# Patient Record
Sex: MALE
[De-identification: names, ages, dates, MRNs, and addresses within clinical notes are randomized per-mention and may not be internally consistent; named-entity substitution may affect disease eponyms.]

---

## 2020-12-09 ENCOUNTER — APPOINTMENT (OUTPATIENT)
Dept: INTERNAL MEDICINE | Facility: CLINIC | Age: 25
End: 2020-12-09

## 2020-12-09 ENCOUNTER — APPOINTMENT (OUTPATIENT)
Dept: INTERNAL MEDICINE | Facility: CLINIC | Age: 25
End: 2020-12-09
Payer: COMMERCIAL

## 2020-12-09 VITALS
HEART RATE: 60 BPM | HEIGHT: 67 IN | BODY MASS INDEX: 28.41 KG/M2 | DIASTOLIC BLOOD PRESSURE: 80 MMHG | SYSTOLIC BLOOD PRESSURE: 114 MMHG | RESPIRATION RATE: 14 BRPM | OXYGEN SATURATION: 98 % | WEIGHT: 181 LBS | TEMPERATURE: 98.5 F

## 2020-12-09 DIAGNOSIS — Z78.9 OTHER SPECIFIED HEALTH STATUS: ICD-10-CM

## 2020-12-09 DIAGNOSIS — Z83.3 FAMILY HISTORY OF DIABETES MELLITUS: ICD-10-CM

## 2020-12-09 PROCEDURE — 99072 ADDL SUPL MATRL&STAF TM PHE: CPT

## 2020-12-09 PROCEDURE — 99395 PREV VISIT EST AGE 18-39: CPT | Mod: 25

## 2020-12-09 PROCEDURE — 36415 COLL VENOUS BLD VENIPUNCTURE: CPT

## 2020-12-10 PROBLEM — Z78.9 NON-SMOKER: Status: ACTIVE | Noted: 2020-12-09

## 2020-12-10 PROBLEM — Z83.3 FAMILY HISTORY OF DIABETES MELLITUS: Status: ACTIVE | Noted: 2020-12-09

## 2020-12-10 PROBLEM — Z78.9 NO KNOWN PROBLEMS: Status: RESOLVED | Noted: 2020-12-09 | Resolved: 2020-12-10

## 2020-12-10 NOTE — PHYSICAL EXAM
[No Acute Distress] : no acute distress [Well Nourished] : well nourished [Well Developed] : well developed [Well-Appearing] : well-appearing [Normal Sclera/Conjunctiva] : normal sclera/conjunctiva [PERRL] : pupils equal round and reactive to light [EOMI] : extraocular movements intact [Normal Outer Ear/Nose] : the outer ears and nose were normal in appearance [Normal Oropharynx] : the oropharynx was normal [No JVD] : no jugular venous distention [No Lymphadenopathy] : no lymphadenopathy [Supple] : supple [Thyroid Normal, No Nodules] : the thyroid was normal and there were no nodules present [No Respiratory Distress] : no respiratory distress  [No Accessory Muscle Use] : no accessory muscle use [Clear to Auscultation] : lungs were clear to auscultation bilaterally [Normal Rate] : normal rate  [Regular Rhythm] : with a regular rhythm [Normal S1, S2] : normal S1 and S2 [No Murmur] : no murmur heard [No Carotid Bruits] : no carotid bruits [No Abdominal Bruit] : a ~M bruit was not heard ~T in the abdomen [No Varicosities] : no varicosities [Pedal Pulses Present] : the pedal pulses are present [No Edema] : there was no peripheral edema [No Palpable Aorta] : no palpable aorta [No Extremity Clubbing/Cyanosis] : no extremity clubbing/cyanosis [Soft] : abdomen soft [Non Tender] : non-tender [Non-distended] : non-distended [No Masses] : no abdominal mass palpated [No HSM] : no HSM [Normal Bowel Sounds] : normal bowel sounds [Normal Posterior Cervical Nodes] : no posterior cervical lymphadenopathy [Normal Anterior Cervical Nodes] : no anterior cervical lymphadenopathy [No CVA Tenderness] : no CVA  tenderness [No Spinal Tenderness] : no spinal tenderness [No Joint Swelling] : no joint swelling [Grossly Normal Strength/Tone] : grossly normal strength/tone [No Rash] : no rash [Coordination Grossly Intact] : coordination grossly intact [No Focal Deficits] : no focal deficits [Normal Gait] : normal gait [Normal Affect] : the affect was normal [Normal Insight/Judgement] : insight and judgment were intact [FreeTextEntry1] : N/A

## 2020-12-10 NOTE — END OF VISIT
[FreeTextEntry3] : I, Tania Sweeney, personally transcribed these services in the presence of Dr. Moises Paez MD. I, Dr. Moises Paez MD, personally performed the services described in this documentation as scribed by Tania Sweeney in my presence and it is both accurate and complete.\par

## 2020-12-10 NOTE — HEALTH RISK ASSESSMENT
[Good] : ~his/her~  mood as  good [No] : In the past 12 months have you used drugs other than those required for medical reasons? No [No falls in past year] : Patient reported no falls in the past year [0] : 2) Feeling down, depressed, or hopeless: Not at all (0) [] : No [Audit-CScore] : 0 [de-identified] : Moderately Active [de-identified] : Regular [[id number]] : 0

## 2020-12-10 NOTE — HISTORY OF PRESENT ILLNESS
[FreeTextEntry1] : Physical examination\par  [de-identified] : MONTEZ PÉREZ is a 25 year old male with no significant PMHx who presents today for physical examination. He is feeling well and offers no specific complaints.

## 2020-12-11 LAB
ALBUMIN SERPL ELPH-MCNC: 5.1 G/DL
ALP BLD-CCNC: 91 U/L
ALT SERPL-CCNC: 21 U/L
ANION GAP SERPL CALC-SCNC: 12 MMOL/L
APPEARANCE: CLEAR
AST SERPL-CCNC: 32 U/L
BACTERIA: NEGATIVE
BASOPHILS # BLD AUTO: 0.08 K/UL
BASOPHILS NFR BLD AUTO: 1.4 %
BILIRUB SERPL-MCNC: 0.4 MG/DL
BILIRUBIN URINE: NEGATIVE
BLOOD URINE: NEGATIVE
BUN SERPL-MCNC: 24 MG/DL
CALCIUM SERPL-MCNC: 9.7 MG/DL
CHLORIDE SERPL-SCNC: 103 MMOL/L
CHOLEST SERPL-MCNC: 133 MG/DL
CO2 SERPL-SCNC: 24 MMOL/L
COLOR: NORMAL
CREAT SERPL-MCNC: 1.07 MG/DL
EOSINOPHIL # BLD AUTO: 0.14 K/UL
EOSINOPHIL NFR BLD AUTO: 2.5 %
GLUCOSE QUALITATIVE U: NEGATIVE
GLUCOSE SERPL-MCNC: 87 MG/DL
HBV SURFACE AB SERPL IA-ACNC: 4.8 MIU/ML
HBV SURFACE AG SER QL: NONREACTIVE
HCT VFR BLD CALC: 45.8 %
HDLC SERPL-MCNC: 52 MG/DL
HGB BLD-MCNC: 15.1 G/DL
HYALINE CASTS: 0 /LPF
IMM GRANULOCYTES NFR BLD AUTO: 0.4 %
KETONES URINE: NEGATIVE
LDLC SERPL CALC-MCNC: 68 MG/DL
LEUKOCYTE ESTERASE URINE: NEGATIVE
LYMPHOCYTES # BLD AUTO: 2.07 K/UL
LYMPHOCYTES NFR BLD AUTO: 37.3 %
MAN DIFF?: NORMAL
MCHC RBC-ENTMCNC: 30.4 PG
MCHC RBC-ENTMCNC: 33 GM/DL
MCV RBC AUTO: 92.2 FL
MEV IGG FLD QL IA: 33.7 AU/ML
MEV IGG+IGM SER-IMP: POSITIVE
MICROSCOPIC-UA: NORMAL
MONOCYTES # BLD AUTO: 0.6 K/UL
MONOCYTES NFR BLD AUTO: 10.8 %
MUV AB SER-ACNC: POSITIVE
MUV IGG SER QL IA: 31.5 AU/ML
NEUTROPHILS # BLD AUTO: 2.64 K/UL
NEUTROPHILS NFR BLD AUTO: 47.6 %
NITRITE URINE: NEGATIVE
NONHDLC SERPL-MCNC: 80 MG/DL
PH URINE: 5.5
PLATELET # BLD AUTO: 275 K/UL
POTASSIUM SERPL-SCNC: 4.6 MMOL/L
PROT SERPL-MCNC: 7.4 G/DL
PROTEIN URINE: NEGATIVE
RBC # BLD: 4.97 M/UL
RBC # FLD: 12.6 %
RED BLOOD CELLS URINE: 0 /HPF
RUBV IGG FLD-ACNC: 13.1 INDEX
RUBV IGG SER-IMP: POSITIVE
SODIUM SERPL-SCNC: 139 MMOL/L
SPECIFIC GRAVITY URINE: 1.03
SQUAMOUS EPITHELIAL CELLS: 0 /HPF
T3 SERPL-MCNC: 86 NG/DL
T4 FREE SERPL-MCNC: 1.4 NG/DL
T4 SERPL-MCNC: 6.6 UG/DL
TRIGL SERPL-MCNC: 63 MG/DL
TSH SERPL-ACNC: 3.81 UIU/ML
UROBILINOGEN URINE: NORMAL
WBC # FLD AUTO: 5.55 K/UL
WHITE BLOOD CELLS URINE: 0 /HPF

## 2020-12-14 ENCOUNTER — APPOINTMENT (OUTPATIENT)
Dept: HEART AND VASCULAR | Facility: CLINIC | Age: 25
End: 2020-12-14

## 2020-12-16 LAB
SARS-COV-2 IGG SERPL IA-ACNC: <0.1 INDEX
SARS-COV-2 IGG SERPL QL IA: NEGATIVE

## 2020-12-29 ENCOUNTER — APPOINTMENT (OUTPATIENT)
Dept: HEART AND VASCULAR | Facility: CLINIC | Age: 25
End: 2020-12-29
Payer: COMMERCIAL

## 2020-12-29 PROCEDURE — 99072 ADDL SUPL MATRL&STAF TM PHE: CPT

## 2020-12-29 PROCEDURE — 90746 HEPB VACCINE 3 DOSE ADULT IM: CPT

## 2020-12-29 PROCEDURE — 90471 IMMUNIZATION ADMIN: CPT

## 2022-01-13 ENCOUNTER — APPOINTMENT (OUTPATIENT)
Dept: INTERNAL MEDICINE | Facility: CLINIC | Age: 27
End: 2022-01-13
Payer: COMMERCIAL

## 2022-01-13 VITALS
DIASTOLIC BLOOD PRESSURE: 76 MMHG | HEART RATE: 67 BPM | OXYGEN SATURATION: 98 % | SYSTOLIC BLOOD PRESSURE: 161 MMHG | BODY MASS INDEX: 27.78 KG/M2 | TEMPERATURE: 98.4 F | HEIGHT: 67 IN | WEIGHT: 177 LBS

## 2022-01-13 VITALS — SYSTOLIC BLOOD PRESSURE: 134 MMHG | DIASTOLIC BLOOD PRESSURE: 77 MMHG

## 2022-01-13 DIAGNOSIS — U07.1 COVID-19: ICD-10-CM

## 2022-01-13 DIAGNOSIS — Z01.84 ENCOUNTER FOR ANTIBODY RESPONSE EXAMINATION: ICD-10-CM

## 2022-01-13 PROCEDURE — 90471 IMMUNIZATION ADMIN: CPT

## 2022-01-13 PROCEDURE — 90715 TDAP VACCINE 7 YRS/> IM: CPT

## 2022-01-13 PROCEDURE — 99395 PREV VISIT EST AGE 18-39: CPT | Mod: 25

## 2022-01-13 PROCEDURE — 36415 COLL VENOUS BLD VENIPUNCTURE: CPT

## 2022-01-15 NOTE — HEALTH RISK ASSESSMENT
[Good] : ~his/her~  mood as  good [Never] : Never [Never (0 pts)] : Never (0 points) [No] : In the past 12 months have you used drugs other than those required for medical reasons? No [No falls in past year] : Patient reported no falls in the past year [0] : 2) Feeling down, depressed, or hopeless: Not at all (0) [Audit-CScore] : 0 [de-identified] : Moderately Active [de-identified] : Regular [[id number]] : 0

## 2022-01-15 NOTE — END OF VISIT
[FreeTextEntry3] : I, Satya Campbell, personally transcribed these services in the presence of Dr. Moises Paez MD. I, Dr. Moises Paez MD, personally performed the services described in this documentation as scribed by Satya Campbell in my presence and it is both accurate and complete.

## 2022-01-15 NOTE — PLAN
[FreeTextEntry1] : Blood tests and urine sent to the lab\par \par Screening for COVID-19 antibodies \par \par T-dap vaccine

## 2022-01-21 LAB
ALBUMIN SERPL ELPH-MCNC: 5.2 G/DL
ALP BLD-CCNC: 106 U/L
ALT SERPL-CCNC: 17 U/L
ANION GAP SERPL CALC-SCNC: 18 MMOL/L
APPEARANCE: CLEAR
AST SERPL-CCNC: 25 U/L
BACTERIA: NEGATIVE
BASOPHILS # BLD AUTO: 0.1 K/UL
BASOPHILS NFR BLD AUTO: 1.7 %
BILIRUB SERPL-MCNC: 0.5 MG/DL
BILIRUBIN URINE: NEGATIVE
BLOOD URINE: NEGATIVE
BUN SERPL-MCNC: 22 MG/DL
C TRACH RRNA SPEC QL NAA+PROBE: NOT DETECTED
CALCIUM SERPL-MCNC: 9.9 MG/DL
CHLORIDE SERPL-SCNC: 101 MMOL/L
CHOLEST SERPL-MCNC: 141 MG/DL
CO2 SERPL-SCNC: 20 MMOL/L
COLOR: YELLOW
COVID-19 NUCLEOCAPSID  GAM ANTIBODY INTERPRETATION: NEGATIVE
CREAT SERPL-MCNC: 1.32 MG/DL
EOSINOPHIL # BLD AUTO: 0.25 K/UL
EOSINOPHIL NFR BLD AUTO: 4.2 %
GLUCOSE QUALITATIVE U: NEGATIVE
GLUCOSE SERPL-MCNC: 77 MG/DL
HBV CORE IGM SER QL: NONREACTIVE
HBV SURFACE AG SER QL: NONREACTIVE
HCT VFR BLD CALC: 46.2 %
HCV AB SER QL: NONREACTIVE
HCV S/CO RATIO: 0.1 S/CO
HDLC SERPL-MCNC: 61 MG/DL
HGB BLD-MCNC: 14.8 G/DL
HIV1+2 AB SPEC QL IA.RAPID: NONREACTIVE
HSV 1+2 IGG SER IA-IMP: NEGATIVE
HSV 1+2 IGG SER IA-IMP: NEGATIVE
HSV1 IGG SER QL: 0.11 INDEX
HSV1 IGM SER QL: NEGATIVE
HSV2 AB FLD-ACNC: NEGATIVE
HSV2 IGG SER QL: 0.07 INDEX
HYALINE CASTS: 0 /LPF
IMM GRANULOCYTES NFR BLD AUTO: 0.8 %
KETONES URINE: NEGATIVE
LDLC SERPL CALC-MCNC: 66 MG/DL
LEUKOCYTE ESTERASE URINE: NEGATIVE
LYMPHOCYTES # BLD AUTO: 1.74 K/UL
LYMPHOCYTES NFR BLD AUTO: 29.4 %
MAN DIFF?: NORMAL
MCHC RBC-ENTMCNC: 29.1 PG
MCHC RBC-ENTMCNC: 32 GM/DL
MCV RBC AUTO: 90.8 FL
MICROSCOPIC-UA: NORMAL
MONOCYTES # BLD AUTO: 0.67 K/UL
MONOCYTES NFR BLD AUTO: 11.3 %
N GONORRHOEA RRNA SPEC QL NAA+PROBE: NOT DETECTED
NEUTROPHILS # BLD AUTO: 3.1 K/UL
NEUTROPHILS NFR BLD AUTO: 52.6 %
NITRITE URINE: NEGATIVE
NONHDLC SERPL-MCNC: 81 MG/DL
PH URINE: 6
PLATELET # BLD AUTO: 311 K/UL
POTASSIUM SERPL-SCNC: 4.1 MMOL/L
PROT SERPL-MCNC: 7.3 G/DL
PROTEIN URINE: NEGATIVE
RBC # BLD: 5.09 M/UL
RBC # FLD: 12.3 %
RED BLOOD CELLS URINE: 0 /HPF
SARS-COV-2 AB SERPL QL IA: 0.69 INDEX
SARS-COV-2 N GENE NPH QL NAA+PROBE: DETECTED
SODIUM SERPL-SCNC: 139 MMOL/L
SOURCE AMPLIFICATION: NORMAL
SPECIFIC GRAVITY URINE: 1.02
SQUAMOUS EPITHELIAL CELLS: 0 /HPF
T PALLIDUM AB SER QL IA: NEGATIVE
T3 SERPL-MCNC: 91 NG/DL
T4 FREE SERPL-MCNC: 1.6 NG/DL
T4 SERPL-MCNC: 6.3 UG/DL
TRIGL SERPL-MCNC: 72 MG/DL
TSH SERPL-ACNC: 2.1 UIU/ML
UROBILINOGEN URINE: NORMAL
WBC # FLD AUTO: 5.91 K/UL
WHITE BLOOD CELLS URINE: 0 /HPF

## 2022-02-03 ENCOUNTER — APPOINTMENT (OUTPATIENT)
Dept: HEART AND VASCULAR | Facility: CLINIC | Age: 27
End: 2022-02-03
Payer: COMMERCIAL

## 2022-02-03 PROCEDURE — 36415 COLL VENOUS BLD VENIPUNCTURE: CPT

## 2022-02-08 LAB — HBV SURFACE AB SERPL IA-ACNC: 9.6 MIU/ML

## 2022-03-03 ENCOUNTER — APPOINTMENT (OUTPATIENT)
Dept: INTERNAL MEDICINE | Facility: CLINIC | Age: 27
End: 2022-03-03
Payer: COMMERCIAL

## 2022-03-03 VITALS
WEIGHT: 176 LBS | HEART RATE: 65 BPM | TEMPERATURE: 97.8 F | RESPIRATION RATE: 14 BRPM | HEIGHT: 67 IN | OXYGEN SATURATION: 98 % | DIASTOLIC BLOOD PRESSURE: 76 MMHG | SYSTOLIC BLOOD PRESSURE: 134 MMHG | BODY MASS INDEX: 27.62 KG/M2

## 2022-03-03 PROCEDURE — 90739 HEPB VACC 2/4 DOSE ADULT IM: CPT

## 2022-03-03 PROCEDURE — 99212 OFFICE O/P EST SF 10 MIN: CPT | Mod: 25

## 2022-03-03 PROCEDURE — 90471 IMMUNIZATION ADMIN: CPT

## 2022-03-05 NOTE — HISTORY OF PRESENT ILLNESS
[FreeTextEntry1] : follow-up  [de-identified] : MONTEZ CHOPRA is a 26 year old male with a PMHx of COVID-19 infection who presents today for second hepatitis B vaccine. He is feeling okay and offers no specific complaints.

## 2022-03-05 NOTE — HEALTH RISK ASSESSMENT
[Never] : Never [Never (0 pts)] : Never (0 points) [No] : In the past 12 months have you used drugs other than those required for medical reasons? No [No falls in past year] : Patient reported no falls in the past year [0] : 2) Feeling down, depressed, or hopeless: Not at all (0) [Audit-CScore] : 0 [de-identified] : Regular [de-identified] : Moderately Active [[id number]] : 0

## 2022-03-10 ENCOUNTER — APPOINTMENT (OUTPATIENT)
Dept: INTERNAL MEDICINE | Facility: CLINIC | Age: 27
End: 2022-03-10
Payer: COMMERCIAL

## 2022-03-10 VITALS
DIASTOLIC BLOOD PRESSURE: 64 MMHG | HEART RATE: 61 BPM | OXYGEN SATURATION: 98 % | BODY MASS INDEX: 27.78 KG/M2 | WEIGHT: 177 LBS | TEMPERATURE: 97.7 F | SYSTOLIC BLOOD PRESSURE: 121 MMHG | HEIGHT: 67 IN | RESPIRATION RATE: 16 BRPM

## 2022-03-10 DIAGNOSIS — Z23 ENCOUNTER FOR IMMUNIZATION: ICD-10-CM

## 2022-03-10 PROCEDURE — 90715 TDAP VACCINE 7 YRS/> IM: CPT

## 2022-03-10 PROCEDURE — 90471 IMMUNIZATION ADMIN: CPT

## 2022-03-10 PROCEDURE — 99212 OFFICE O/P EST SF 10 MIN: CPT | Mod: 25

## 2022-03-12 NOTE — HEALTH RISK ASSESSMENT
[Never] : Never [Never (0 pts)] : Never (0 points) [No] : In the past 12 months have you used drugs other than those required for medical reasons? No [No falls in past year] : Patient reported no falls in the past year [0] : 2) Feeling down, depressed, or hopeless: Not at all (0) [Audit-CScore] : 0 [de-identified] : Moderately Active [de-identified] : Regular [[id number]] : 0

## 2022-03-12 NOTE — HISTORY OF PRESENT ILLNESS
[FreeTextEntry1] : follow-up  [de-identified] : MONTEZ CHOPRA is a 26 year old male with a PMHx of COVID-19 infection who presents today for follow-up. Pt had second hepatitis B vaccine 1 week ago. He is feeling okay and is here today for Tdap vaccine.

## 2023-03-02 NOTE — REVIEW OF SYSTEMS
30 yr old male with no significant PMH presents with 1 month history of intractable migraines associated with blurry vision when turning his head to the left this past ~10day. Also reports nausea and vomiting that has been occurring for >1 month with feelings of indigestion. Also reports paresthesias in his upper extremities that occur intermittently. Denies any focal weakness. Does not recall any exacerbating or relieving factors.   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 sarcoid to be considered but TB, lymphoma, infectious inflammatory and mets not excluded.     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  would check CT C/A/P  - would pursue LP and send for cell count, glucose, protein, flow, cytology, infectious panel and rheum panel. OGG, MBP, paraneoplastic, viral panel   - tylenol PRN for HA for now. defer steroids in case he has lymphoma  - would get optho eval.    - may eventually need to be transfered for higher level of care    - check FS, glucose control <180  - GI/DVT ppx  - Counseling on diet, exercise, and medication adherence was done  - Counseling on smoking cessation and alcohol consumption offered when appropriate.  - Pain assessed and judicious use of narcotics when appropriate was discussed.    - Stroke education given when appropriate.  - Importance of fall prevention discussed.   - Differential diagnosis and plan of care discussed with patient and/or family and primary team  - Thank you for allowing me to participate in the care of this patient. Call with questions.   Merlin Ko MD  Vascular Neurology  Office: [phone]      [Negative] : Heme/Lymph

## 2023-07-22 NOTE — PHYSICAL EXAM
[No Acute Distress] : no acute distress [Well Nourished] : well nourished [Well Developed] : well developed [Well-Appearing] : well-appearing [Normal Sclera/Conjunctiva] : normal sclera/conjunctiva [PERRL] : pupils equal round and reactive to light [EOMI] : extraocular movements intact [Normal Outer Ear/Nose] : the outer ears and nose were normal in appearance [Normal Oropharynx] : the oropharynx was normal [No JVD] : no jugular venous distention [No Lymphadenopathy] : no lymphadenopathy [Supple] : supple [Thyroid Normal, No Nodules] : the thyroid was normal and there were no nodules present [No Respiratory Distress] : no respiratory distress  [No Accessory Muscle Use] : no accessory muscle use [Clear to Auscultation] : lungs were clear to auscultation bilaterally [Normal Rate] : normal rate  [Regular Rhythm] : with a regular rhythm [Normal S1, S2] : normal S1 and S2 [No Murmur] : no murmur heard supervision/verbal cues/nonverbal cues (demo/gestures) [No Carotid Bruits] : no carotid bruits [No Abdominal Bruit] : a ~M bruit was not heard ~T in the abdomen [No Varicosities] : no varicosities [Pedal Pulses Present] : the pedal pulses are present [No Edema] : there was no peripheral edema [No Palpable Aorta] : no palpable aorta [No Extremity Clubbing/Cyanosis] : no extremity clubbing/cyanosis [Soft] : abdomen soft [Non Tender] : non-tender [Non-distended] : non-distended [No Masses] : no abdominal mass palpated [No HSM] : no HSM [Normal Bowel Sounds] : normal bowel sounds [Normal Anterior Cervical Nodes] : no anterior cervical lymphadenopathy [Normal Posterior Cervical Nodes] : no posterior cervical lymphadenopathy [No CVA Tenderness] : no CVA  tenderness [No Spinal Tenderness] : no spinal tenderness [No Joint Swelling] : no joint swelling [Grossly Normal Strength/Tone] : grossly normal strength/tone [No Rash] : no rash [Coordination Grossly Intact] : coordination grossly intact [No Focal Deficits] : no focal deficits [Normal Gait] : normal gait [Deep Tendon Reflexes (DTR)] : deep tendon reflexes were 2+ and symmetric [Normal Affect] : the affect was normal [Normal Insight/Judgement] : insight and judgment were intact [FreeTextEntry1] : N/A

## 2023-12-11 ENCOUNTER — APPOINTMENT (OUTPATIENT)
Dept: INTERNAL MEDICINE | Facility: CLINIC | Age: 28
End: 2023-12-11

## 2023-12-12 ENCOUNTER — APPOINTMENT (OUTPATIENT)
Dept: FAMILY MEDICINE | Facility: CLINIC | Age: 28
End: 2023-12-12
Payer: COMMERCIAL

## 2023-12-12 VITALS
DIASTOLIC BLOOD PRESSURE: 83 MMHG | BODY MASS INDEX: 27.47 KG/M2 | HEIGHT: 67 IN | TEMPERATURE: 97.7 F | HEART RATE: 55 BPM | SYSTOLIC BLOOD PRESSURE: 163 MMHG | WEIGHT: 175 LBS | OXYGEN SATURATION: 99 % | RESPIRATION RATE: 15 BRPM

## 2023-12-12 DIAGNOSIS — Z00.00 ENCOUNTER FOR GENERAL ADULT MEDICAL EXAMINATION W/OUT ABNORMAL FINDINGS: ICD-10-CM

## 2023-12-12 DIAGNOSIS — Z82.49 FAMILY HISTORY OF ISCHEMIC HEART DISEASE AND OTHER DISEASES OF THE CIRCULATORY SYSTEM: ICD-10-CM

## 2023-12-12 PROCEDURE — 99395 PREV VISIT EST AGE 18-39: CPT | Mod: 25

## 2023-12-12 PROCEDURE — 36415 COLL VENOUS BLD VENIPUNCTURE: CPT

## 2023-12-13 ENCOUNTER — TRANSCRIPTION ENCOUNTER (OUTPATIENT)
Age: 28
End: 2023-12-13

## 2023-12-14 LAB
ALBUMIN SERPL ELPH-MCNC: 5.2 G/DL
ALP BLD-CCNC: 87 U/L
ALT SERPL-CCNC: 20 U/L
ANION GAP SERPL CALC-SCNC: 14 MMOL/L
AST SERPL-CCNC: 24 U/L
BASOPHILS # BLD AUTO: 0.12 K/UL
BASOPHILS NFR BLD AUTO: 2 %
BILIRUB SERPL-MCNC: 0.5 MG/DL
BUN SERPL-MCNC: 24 MG/DL
C TRACH RRNA SPEC QL NAA+PROBE: NOT DETECTED
CALCIUM SERPL-MCNC: 10.2 MG/DL
CHLORIDE SERPL-SCNC: 102 MMOL/L
CHOLEST SERPL-MCNC: 138 MG/DL
CO2 SERPL-SCNC: 26 MMOL/L
CREAT SERPL-MCNC: 1.45 MG/DL
EGFR: 68 ML/MIN/1.73M2
EOSINOPHIL # BLD AUTO: 0.25 K/UL
EOSINOPHIL NFR BLD AUTO: 4.2 %
ESTIMATED AVERAGE GLUCOSE: 103 MG/DL
GLUCOSE SERPL-MCNC: 99 MG/DL
HBA1C MFR BLD HPLC: 5.2 %
HBV SURFACE AB SER QL: REACTIVE
HBV SURFACE AG SER QL: NONREACTIVE
HCT VFR BLD CALC: 43.3 %
HCV AB SER QL: NONREACTIVE
HCV S/CO RATIO: 0.06 S/CO
HDLC SERPL-MCNC: 74 MG/DL
HGB BLD-MCNC: 14.2 G/DL
HIV1+2 AB SPEC QL IA.RAPID: NONREACTIVE
IMM GRANULOCYTES NFR BLD AUTO: 0.3 %
LDLC SERPL CALC-MCNC: 49 MG/DL
LYMPHOCYTES # BLD AUTO: 2.13 K/UL
LYMPHOCYTES NFR BLD AUTO: 36.1 %
MAN DIFF?: NORMAL
MCHC RBC-ENTMCNC: 29.6 PG
MCHC RBC-ENTMCNC: 32.8 GM/DL
MCV RBC AUTO: 90.2 FL
MONOCYTES # BLD AUTO: 0.46 K/UL
MONOCYTES NFR BLD AUTO: 7.8 %
N GONORRHOEA RRNA SPEC QL NAA+PROBE: NOT DETECTED
NEUTROPHILS # BLD AUTO: 2.92 K/UL
NEUTROPHILS NFR BLD AUTO: 49.6 %
NONHDLC SERPL-MCNC: 64 MG/DL
PLATELET # BLD AUTO: 314 K/UL
POTASSIUM SERPL-SCNC: 3.8 MMOL/L
PROT SERPL-MCNC: 7.8 G/DL
RBC # BLD: 4.8 M/UL
RBC # FLD: 12.2 %
SODIUM SERPL-SCNC: 141 MMOL/L
SOURCE AMPLIFICATION: NORMAL
T PALLIDUM AB SER QL IA: NEGATIVE
TRIGL SERPL-MCNC: 74 MG/DL
WBC # FLD AUTO: 5.9 K/UL

## 2024-06-27 ENCOUNTER — APPOINTMENT (OUTPATIENT)
Age: 29
End: 2024-06-27
Payer: COMMERCIAL

## 2024-06-27 VITALS
TEMPERATURE: 97.3 F | RESPIRATION RATE: 14 BRPM | HEIGHT: 67 IN | SYSTOLIC BLOOD PRESSURE: 126 MMHG | DIASTOLIC BLOOD PRESSURE: 74 MMHG | BODY MASS INDEX: 27.47 KG/M2 | HEART RATE: 49 BPM | WEIGHT: 175 LBS | OXYGEN SATURATION: 100 %

## 2024-06-27 DIAGNOSIS — Z11.3 ENCOUNTER FOR SCREENING FOR INFECTIONS WITH A PREDOMINANTLY SEXUAL MODE OF TRANSMISSION: ICD-10-CM

## 2024-06-27 DIAGNOSIS — R79.89 OTHER SPECIFIED ABNORMAL FINDINGS OF BLOOD CHEMISTRY: ICD-10-CM

## 2024-06-27 PROCEDURE — 99213 OFFICE O/P EST LOW 20 MIN: CPT

## 2024-06-27 PROCEDURE — 36415 COLL VENOUS BLD VENIPUNCTURE: CPT

## 2024-07-02 LAB
ALBUMIN SERPL ELPH-MCNC: 5 G/DL
ALP BLD-CCNC: 76 U/L
ALT SERPL-CCNC: 14 U/L
ANION GAP SERPL CALC-SCNC: 18 MMOL/L
AST SERPL-CCNC: 21 U/L
BILIRUB SERPL-MCNC: 0.4 MG/DL
BUN SERPL-MCNC: 26 MG/DL
C TRACH RRNA SPEC QL NAA+PROBE: NOT DETECTED
CALCIUM SERPL-MCNC: 10.4 MG/DL
CHLORIDE SERPL-SCNC: 102 MMOL/L
CO2 SERPL-SCNC: 21 MMOL/L
CREAT SERPL-MCNC: 1.18 MG/DL
EGFR: 87 ML/MIN/1.73M2
GLUCOSE SERPL-MCNC: 82 MG/DL
HBV SURFACE AB SER QL: REACTIVE
HBV SURFACE AG SER QL: NONREACTIVE
HCV AB SER QL: NONREACTIVE
HCV S/CO RATIO: 0.08 S/CO
HIV1+2 AB SPEC QL IA.RAPID: NONREACTIVE
N GONORRHOEA RRNA SPEC QL NAA+PROBE: NOT DETECTED
POTASSIUM SERPL-SCNC: 4.8 MMOL/L
PROT SERPL-MCNC: 7.5 G/DL
SODIUM SERPL-SCNC: 141 MMOL/L
SOURCE AMPLIFICATION: NORMAL
T PALLIDUM AB SER QL IA: NEGATIVE

## 2025-04-08 ENCOUNTER — APPOINTMENT (OUTPATIENT)
Age: 30
End: 2025-04-08
Payer: COMMERCIAL

## 2025-04-08 ENCOUNTER — NON-APPOINTMENT (OUTPATIENT)
Age: 30
End: 2025-04-08

## 2025-04-08 VITALS
BODY MASS INDEX: 29.03 KG/M2 | HEART RATE: 52 BPM | SYSTOLIC BLOOD PRESSURE: 142 MMHG | WEIGHT: 185 LBS | RESPIRATION RATE: 14 BRPM | DIASTOLIC BLOOD PRESSURE: 82 MMHG | OXYGEN SATURATION: 99 % | TEMPERATURE: 97.6 F | HEIGHT: 67 IN

## 2025-04-08 VITALS — DIASTOLIC BLOOD PRESSURE: 70 MMHG | SYSTOLIC BLOOD PRESSURE: 108 MMHG

## 2025-04-08 DIAGNOSIS — L30.9 DERMATITIS, UNSPECIFIED: ICD-10-CM

## 2025-04-08 DIAGNOSIS — Z00.00 ENCOUNTER FOR GENERAL ADULT MEDICAL EXAMINATION W/OUT ABNORMAL FINDINGS: ICD-10-CM

## 2025-04-08 DIAGNOSIS — R79.89 OTHER SPECIFIED ABNORMAL FINDINGS OF BLOOD CHEMISTRY: ICD-10-CM

## 2025-04-08 PROCEDURE — 99214 OFFICE O/P EST MOD 30 MIN: CPT

## 2025-04-08 PROCEDURE — 36415 COLL VENOUS BLD VENIPUNCTURE: CPT

## 2025-04-08 PROCEDURE — G2211 COMPLEX E/M VISIT ADD ON: CPT | Mod: NC

## 2025-04-08 RX ORDER — TRIAMCINOLONE ACETONIDE 1 MG/G
0.1 OINTMENT TOPICAL TWICE DAILY
Qty: 1 | Refills: 1 | Status: ACTIVE | COMMUNITY
Start: 2025-04-08 | End: 1900-01-01

## 2025-04-11 LAB
ALBUMIN SERPL ELPH-MCNC: 5.2 G/DL
ALP BLD-CCNC: 84 U/L
ALT SERPL-CCNC: 16 U/L
ANION GAP SERPL CALC-SCNC: 13 MMOL/L
AST SERPL-CCNC: 28 U/L
BASOPHILS # BLD AUTO: 0.09 K/UL
BASOPHILS NFR BLD AUTO: 1.5 %
BILIRUB SERPL-MCNC: 0.3 MG/DL
BUN SERPL-MCNC: 22 MG/DL
C TRACH RRNA SPEC QL NAA+PROBE: NOT DETECTED
CALCIUM SERPL-MCNC: 10.4 MG/DL
CHLORIDE SERPL-SCNC: 100 MMOL/L
CHOLEST SERPL-MCNC: 143 MG/DL
CO2 SERPL-SCNC: 25 MMOL/L
CREAT SERPL-MCNC: 1.29 MG/DL
EGFRCR SERPLBLD CKD-EPI 2021: 77 ML/MIN/1.73M2
EOSINOPHIL # BLD AUTO: 0.29 K/UL
EOSINOPHIL NFR BLD AUTO: 4.7 %
ESTIMATED AVERAGE GLUCOSE: 111 MG/DL
GLUCOSE SERPL-MCNC: 82 MG/DL
HBA1C MFR BLD HPLC: 5.5 %
HCT VFR BLD CALC: 43.7 %
HDLC SERPL-MCNC: 62 MG/DL
HGB BLD-MCNC: 15 G/DL
IMM GRANULOCYTES NFR BLD AUTO: 0.3 %
LDLC SERPL-MCNC: 63 MG/DL
LYMPHOCYTES # BLD AUTO: 2.04 K/UL
LYMPHOCYTES NFR BLD AUTO: 33.3 %
MAN DIFF?: NORMAL
MCHC RBC-ENTMCNC: 29.4 PG
MCHC RBC-ENTMCNC: 34.3 G/DL
MCV RBC AUTO: 85.5 FL
MONOCYTES # BLD AUTO: 0.65 K/UL
MONOCYTES NFR BLD AUTO: 10.6 %
N GONORRHOEA RRNA SPEC QL NAA+PROBE: NOT DETECTED
NEUTROPHILS # BLD AUTO: 3.03 K/UL
NEUTROPHILS NFR BLD AUTO: 49.6 %
NONHDLC SERPL-MCNC: 81 MG/DL
PLATELET # BLD AUTO: 319 K/UL
POTASSIUM SERPL-SCNC: 4.1 MMOL/L
PROT SERPL-MCNC: 7.9 G/DL
RBC # BLD: 5.11 M/UL
RBC # FLD: 11.9 %
SODIUM SERPL-SCNC: 139 MMOL/L
SOURCE AMPLIFICATION: NORMAL
TRIGL SERPL-MCNC: 101 MG/DL
WBC # FLD AUTO: 6.12 K/UL

## 2025-04-14 LAB
HCV AB SER QL: NONREACTIVE
HCV S/CO RATIO: 0.12 S/CO
HIV1+2 AB SPEC QL IA.RAPID: NONREACTIVE

## 2025-04-22 ENCOUNTER — APPOINTMENT (OUTPATIENT)
Age: 30
End: 2025-04-22

## 2025-09-12 ENCOUNTER — APPOINTMENT (OUTPATIENT)
Age: 30
End: 2025-09-12

## 2025-09-12 VITALS
RESPIRATION RATE: 14 BRPM | TEMPERATURE: 97.6 F | OXYGEN SATURATION: 97 % | SYSTOLIC BLOOD PRESSURE: 159 MMHG | HEART RATE: 66 BPM | DIASTOLIC BLOOD PRESSURE: 78 MMHG | HEIGHT: 67 IN | WEIGHT: 189 LBS | BODY MASS INDEX: 29.66 KG/M2

## 2025-09-12 DIAGNOSIS — Z00.00 ENCOUNTER FOR GENERAL ADULT MEDICAL EXAMINATION W/OUT ABNORMAL FINDINGS: ICD-10-CM

## 2025-09-15 LAB
HBV SURFACE AB SERPL IA-ACNC: 14.1 MIU/ML
MEV IGG FLD QL IA: 22.8 AU/ML
MEV IGG+IGM SER-IMP: POSITIVE
MUV AB SER-ACNC: NORMAL
MUV IGG SER QL IA: 10.7 AU/ML
RUBV IGG FLD-ACNC: 10.3 INDEX
RUBV IGG SER-IMP: POSITIVE
VZV AB TITR SER: POSITIVE
VZV IGG SER IF-ACNC: 9.17 S/CO

## 2025-09-16 LAB
M TB IFN-G BLD-IMP: NEGATIVE
QUANTIFERON TB PLUS MITOGEN MINUS NIL: >10 IU/ML
QUANTIFERON TB PLUS NIL: 0.01 IU/ML
QUANTIFERON TB PLUS TB1 MINUS NIL: 0 IU/ML
QUANTIFERON TB PLUS TB2 MINUS NIL: 0 IU/ML